# Patient Record
Sex: MALE | Race: WHITE | Employment: FULL TIME | URBAN - NONMETROPOLITAN AREA
[De-identification: names, ages, dates, MRNs, and addresses within clinical notes are randomized per-mention and may not be internally consistent; named-entity substitution may affect disease eponyms.]

---

## 2020-07-18 ENCOUNTER — APPOINTMENT (OUTPATIENT)
Dept: GENERAL RADIOLOGY | Age: 25
End: 2020-07-18

## 2020-07-18 ENCOUNTER — HOSPITAL ENCOUNTER (EMERGENCY)
Age: 25
Discharge: HOME | End: 2020-07-18
Attending: EMERGENCY MEDICINE

## 2020-07-18 VITALS
HEIGHT: 70 IN | HEART RATE: 75 BPM | SYSTOLIC BLOOD PRESSURE: 108 MMHG | WEIGHT: 150 LBS | TEMPERATURE: 98.4 F | OXYGEN SATURATION: 93 % | RESPIRATION RATE: 14 BRPM | BODY MASS INDEX: 21.47 KG/M2 | DIASTOLIC BLOOD PRESSURE: 64 MMHG

## 2020-07-18 LAB
AMPHETAMINE+METHAMPHETAMINE URINE SCREEN: NEGATIVE
BARBITURATE QUANTITATIVE URINE: NEGATIVE
BENZODIAZEPINE QUANTITATIVE URINE: NEGATIVE
CANNABINOID QUANTITATIVE URINE: NEGATIVE
COCAINE METABOLITE QUANTITATIVE URINE: NEGATIVE
ETHYL ALCOHOL, SERUM: 0.29 %
OPIATES, URINE: NEGATIVE
OXYCODONE: NEGATIVE
PHENCYCLIDINE QUANTITATIVE URINE: NEGATIVE

## 2020-07-18 PROCEDURE — 80307 DRUG TEST PRSMV CHEM ANLYZR: CPT

## 2020-07-18 PROCEDURE — 90471 IMMUNIZATION ADMIN: CPT | Performed by: EMERGENCY MEDICINE

## 2020-07-18 PROCEDURE — 6360000002 HC RX W HCPCS: Performed by: EMERGENCY MEDICINE

## 2020-07-18 PROCEDURE — 6370000000 HC RX 637 (ALT 250 FOR IP): Performed by: EMERGENCY MEDICINE

## 2020-07-18 PROCEDURE — G0480 DRUG TEST DEF 1-7 CLASSES: HCPCS

## 2020-07-18 PROCEDURE — 36415 COLL VENOUS BLD VENIPUNCTURE: CPT

## 2020-07-18 PROCEDURE — 99285 EMERGENCY DEPT VISIT HI MDM: CPT

## 2020-07-18 PROCEDURE — 72040 X-RAY EXAM NECK SPINE 2-3 VW: CPT

## 2020-07-18 PROCEDURE — 96372 THER/PROPH/DIAG INJ SC/IM: CPT

## 2020-07-18 PROCEDURE — 90715 TDAP VACCINE 7 YRS/> IM: CPT | Performed by: EMERGENCY MEDICINE

## 2020-07-18 RX ORDER — ACETAMINOPHEN 325 MG/1
650 TABLET ORAL ONCE
Status: COMPLETED | OUTPATIENT
Start: 2020-07-18 | End: 2020-07-18

## 2020-07-18 RX ADMIN — TETANUS TOXOID, REDUCED DIPHTHERIA TOXOID AND ACELLULAR PERTUSSIS VACCINE, ADSORBED 0.5 ML: 5; 2.5; 8; 8; 2.5 SUSPENSION INTRAMUSCULAR at 01:01

## 2020-07-18 RX ADMIN — ACETAMINOPHEN 650 MG: 325 TABLET ORAL at 00:59

## 2020-07-18 ASSESSMENT — ENCOUNTER SYMPTOMS
EYE ITCHING: 0
VOMITING: 0
SHORTNESS OF BREATH: 0
COUGH: 0
PHOTOPHOBIA: 0
EYE REDNESS: 0
ABDOMINAL DISTENTION: 0
DIARRHEA: 0
WHEEZING: 0
NAUSEA: 0
EYE PAIN: 0
CHEST TIGHTNESS: 0
SORE THROAT: 0
EYE DISCHARGE: 0
CONSTIPATION: 0
ABDOMINAL PAIN: 0
BACK PAIN: 0
RHINORRHEA: 0
STRIDOR: 0

## 2020-07-18 ASSESSMENT — PAIN SCALES - GENERAL: PAINLEVEL_OUTOF10: 5

## 2020-07-18 NOTE — ED NOTES
Bed: 006A  Expected date: 7/17/20  Expected time: 11:55 PM  Means of arrival: [facility]  Comments:     Luis Manuel Kirkpatrick RN  07/18/20 0003

## 2020-07-18 NOTE — ED NOTES
Pt back from radiology. Pt VS reassessed. Pt states he still has numbness in his left foot. RR regular. Pt HOB elevated at this time. Pt placed in C collar.  Will continue to monitor      Cecille Powell RN  07/18/20 1999

## 2020-07-18 NOTE — ED NOTES
Pt medicated per MAR. Pt urinated in urinal and then to radiology.       Jersey Mackey RN  07/18/20 6587

## 2020-07-18 NOTE — ED NOTES
Dr. Suellen Rivera at bedside to assessed pt spine and pt taken off back board while this RN maintained c spine      eTre Arroyo RN  07/18/20 0125
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
## 2023-04-03 ENCOUNTER — HOSPITAL ENCOUNTER (EMERGENCY)
Age: 28
Discharge: HOME | End: 2023-04-03
Attending: EMERGENCY MEDICINE
Payer: COMMERCIAL

## 2023-04-03 VITALS
DIASTOLIC BLOOD PRESSURE: 74 MMHG | WEIGHT: 155 LBS | SYSTOLIC BLOOD PRESSURE: 119 MMHG | TEMPERATURE: 97.5 F | HEIGHT: 70 IN | HEART RATE: 88 BPM | OXYGEN SATURATION: 100 % | BODY MASS INDEX: 22.19 KG/M2 | RESPIRATION RATE: 18 BRPM

## 2023-04-03 DIAGNOSIS — M54.9 UPPER BACK PAIN: Primary | ICD-10-CM

## 2023-04-03 PROCEDURE — 99283 EMERGENCY DEPT VISIT LOW MDM: CPT

## 2023-04-03 PROCEDURE — 6370000000 HC RX 637 (ALT 250 FOR IP): Performed by: EMERGENCY MEDICINE

## 2023-04-03 RX ORDER — IBUPROFEN 200 MG
600 TABLET ORAL ONCE
Status: COMPLETED | OUTPATIENT
Start: 2023-04-03 | End: 2023-04-03

## 2023-04-03 RX ORDER — CYCLOBENZAPRINE HCL 5 MG
5 TABLET ORAL 2 TIMES DAILY PRN
Qty: 10 TABLET | Refills: 0 | Status: SHIPPED | OUTPATIENT
Start: 2023-04-03 | End: 2023-04-13

## 2023-04-03 RX ADMIN — IBUPROFEN 600 MG: 200 TABLET, FILM COATED ORAL at 18:18

## 2023-04-03 ASSESSMENT — PAIN DESCRIPTION - LOCATION
LOCATION: BACK

## 2023-04-03 ASSESSMENT — PAIN SCALES - GENERAL
PAINLEVEL_OUTOF10: 5
PAINLEVEL_OUTOF10: 6

## 2023-04-03 ASSESSMENT — PAIN DESCRIPTION - ORIENTATION
ORIENTATION: UPPER
ORIENTATION: UPPER

## 2023-04-03 ASSESSMENT — PAIN - FUNCTIONAL ASSESSMENT
PAIN_FUNCTIONAL_ASSESSMENT: 0-10
PAIN_FUNCTIONAL_ASSESSMENT: 0-10

## 2023-04-03 NOTE — Clinical Note
Suresh Escobar was seen and treated in our emergency department on 4/3/2023. He may return to work on 04/04/2023. Mr. Has needs to be on light duty,  no lifting more than 10 pounds for 5 days     If you have any questions or concerns, please don't hesitate to call.       Airam Vu MD

## 2023-04-03 NOTE — ED NOTES
Presents ambulatory with complaints of upper back pain after putting judith down in his home on Friday. Pain worse with movement. .  Patient did not go to work today due to pain and work suggested that he get checked.      Cherelle Munoz RN  04/03/23 1102

## 2023-04-03 NOTE — ED NOTES
Discharge instructions reviewed with patient and questions answered. Skin warm and dry, color normal for ethnicity. Remains alert and cooperative. Denies further questions or concerns at this time.       Adelfo Whitaker RN  04/03/23 6082

## 2023-04-03 NOTE — ED PROVIDER NOTES
ill-appearing or diaphoretic. HENT:      Head: Atraumatic. Neck:      Thyroid: No thyromegaly. Vascular: No JVD. Trachea: No tracheal deviation. Cardiovascular:      Rate and Rhythm: Normal rate and regular rhythm. Heart sounds: No murmur heard. No friction rub. No gallop. Pulmonary:      Effort: Pulmonary effort is normal.      Breath sounds: Normal breath sounds. Abdominal:      General: Bowel sounds are normal.      Palpations: Abdomen is soft. Tenderness: There is no abdominal tenderness. Musculoskeletal:      Cervical back: Neck supple. No tenderness. Comments: Back examination showed no abnormality. There is slight tenderness palpation of the paraspinal soft tissue in the intrascapular area. There was no spinal tenderness. Neurological:      Mental Status: He is alert. Motor: No weakness. DIFFERENTIAL DIAGNOSIS:       DIAGNOSTIC RESULTS       LABS:   Labs Reviewed - No data to display    EMERGENCY DEPARTMENT COURSE:   Vitals:    Vitals:    04/03/23 1744   BP: 119/74   Pulse: 88   Resp: 18   Temp: 97.5 °F (36.4 °C)   TempSrc: Temporal   SpO2: 100%   Weight: 155 lb (70.3 kg)   Height: 5' 10\" (1.778 m)     MDM:  Patient presented with acute back pain as mentioned above. I reviewed his medical history from the medical records and from the patient himself. He has no chronic comorbidities. I reviewed his vital signs and interpreted them as normal.  He is physical exam is compatible with musculoskeletal upper back pain. Normal neurological exam.  I ordered him 600 mg of Motrin p.o. He is stable for discharge. I discussed diagnosis and treatment plan with him. I gave him strict return instructions. I prescribed him lactulose. FINAL IMPRESSION      1. Upper back pain          DISPOSITION/PLAN   Discharged home in good condition.     PATIENT REFERRED TO:    Please call  to establish a family doctor          DISCHARGE MEDICATIONS:  New

## 2023-04-03 NOTE — DISCHARGE INSTRUCTIONS
Please take Motrin 600 every 8 hours as needed for pain  Take Flexeril twice a day for 5 days  Perform gentle stretching exercises  No heavy lifting for 5 days  Please return if worse or new symptoms

## 2024-03-18 ENCOUNTER — TELEPHONE (OUTPATIENT)
Dept: FAMILY MEDICINE CLINIC | Age: 29
End: 2024-03-18

## 2024-03-18 NOTE — TELEPHONE ENCOUNTER
Patient wanting to schedule new patient appointment with Dr. Leopold. His wife and children follow with you.     Is it okay to schedule him  ?

## 2024-03-18 NOTE — TELEPHONE ENCOUNTER
----- Message from Carmen Vizcarra sent at 3/18/2024  9:51 AM EDT -----  Subject: Appointment Request    Reason for Call: New Patient/New to Provider Appointment needed: New   Patient Request Appointment    QUESTIONS    Reason for appointment request? Requested Provider unavailable - KATELY LEOPOLD     Additional Information for Provider? Spoke with patient's wife Porsha,   she is wondering if Dr. Leopold would be willing to take her  on as   a new patient. She states that her childern (Tamir and Judith Rodarte) are   current patient's of hers. Please return this call to assist, thank you.   ---------------------------------------------------------------------------  --------------  CALL BACK INFO  [phone]; OK to leave message on voicemail  ---------------------------------------------------------------------------  --------------  SCRIPT ANSWERS

## 2024-04-12 ENCOUNTER — OFFICE VISIT (OUTPATIENT)
Dept: FAMILY MEDICINE CLINIC | Age: 29
End: 2024-04-12
Payer: COMMERCIAL

## 2024-04-12 VITALS
BODY MASS INDEX: 22.42 KG/M2 | HEIGHT: 70 IN | OXYGEN SATURATION: 98 % | DIASTOLIC BLOOD PRESSURE: 70 MMHG | SYSTOLIC BLOOD PRESSURE: 106 MMHG | HEART RATE: 82 BPM | WEIGHT: 156.6 LBS

## 2024-04-12 DIAGNOSIS — R07.82 INTERCOSTAL PAIN: ICD-10-CM

## 2024-04-12 DIAGNOSIS — Z00.00 WELL ADULT EXAM: Primary | ICD-10-CM

## 2024-04-12 DIAGNOSIS — F10.90 ALCOHOL USE DISORDER: ICD-10-CM

## 2024-04-12 DIAGNOSIS — R06.83 SNORING: ICD-10-CM

## 2024-04-12 DIAGNOSIS — R53.83 OTHER FATIGUE: ICD-10-CM

## 2024-04-12 PROCEDURE — 99395 PREV VISIT EST AGE 18-39: CPT | Performed by: FAMILY MEDICINE

## 2024-04-12 RX ORDER — ELECTROLYTES/DEXTROSE
1 SOLUTION, ORAL ORAL DAILY
COMMUNITY

## 2024-04-12 SDOH — ECONOMIC STABILITY: HOUSING INSECURITY
IN THE LAST 12 MONTHS, WAS THERE A TIME WHEN YOU DID NOT HAVE A STEADY PLACE TO SLEEP OR SLEPT IN A SHELTER (INCLUDING NOW)?: NO

## 2024-04-12 SDOH — ECONOMIC STABILITY: FOOD INSECURITY: WITHIN THE PAST 12 MONTHS, YOU WORRIED THAT YOUR FOOD WOULD RUN OUT BEFORE YOU GOT MONEY TO BUY MORE.: NEVER TRUE

## 2024-04-12 SDOH — ECONOMIC STABILITY: FOOD INSECURITY: WITHIN THE PAST 12 MONTHS, THE FOOD YOU BOUGHT JUST DIDN'T LAST AND YOU DIDN'T HAVE MONEY TO GET MORE.: NEVER TRUE

## 2024-04-12 SDOH — ECONOMIC STABILITY: INCOME INSECURITY: HOW HARD IS IT FOR YOU TO PAY FOR THE VERY BASICS LIKE FOOD, HOUSING, MEDICAL CARE, AND HEATING?: NOT HARD AT ALL

## 2024-04-12 ASSESSMENT — ENCOUNTER SYMPTOMS
CONSTIPATION: 0
SORE THROAT: 0
TROUBLE SWALLOWING: 0
VOMITING: 0
SHORTNESS OF BREATH: 0
COUGH: 0
DIARRHEA: 0
ABDOMINAL PAIN: 0

## 2024-04-12 ASSESSMENT — PATIENT HEALTH QUESTIONNAIRE - PHQ9
SUM OF ALL RESPONSES TO PHQ9 QUESTIONS 1 & 2: 0
SUM OF ALL RESPONSES TO PHQ QUESTIONS 1-9: 0
2. FEELING DOWN, DEPRESSED OR HOPELESS: NOT AT ALL
1. LITTLE INTEREST OR PLEASURE IN DOING THINGS: NOT AT ALL
SUM OF ALL RESPONSES TO PHQ QUESTIONS 1-9: 0

## 2024-04-15 ENCOUNTER — HOSPITAL ENCOUNTER (OUTPATIENT)
Dept: GENERAL RADIOLOGY | Age: 29
Discharge: HOME | End: 2024-04-15
Payer: COMMERCIAL

## 2024-04-15 ENCOUNTER — HOSPITAL ENCOUNTER (OUTPATIENT)
Age: 29
Discharge: HOME | End: 2024-04-15
Payer: COMMERCIAL

## 2024-04-15 DIAGNOSIS — R53.83 OTHER FATIGUE: ICD-10-CM

## 2024-04-15 DIAGNOSIS — Z00.00 WELL ADULT EXAM: ICD-10-CM

## 2024-04-15 DIAGNOSIS — R07.82 INTERCOSTAL PAIN: ICD-10-CM

## 2024-04-15 DIAGNOSIS — F10.90 ALCOHOL USE DISORDER: ICD-10-CM

## 2024-04-15 LAB
ALBUMIN SERPL BCG-MCNC: 5 G/DL (ref 3.5–5.1)
ALP SERPL-CCNC: 83 U/L (ref 38–126)
ALT SERPL W/O P-5'-P-CCNC: 21 U/L (ref 11–66)
ANION GAP SERPL CALC-SCNC: 13 MEQ/L (ref 8–16)
AST SERPL-CCNC: 30 U/L (ref 5–40)
BASOPHILS ABSOLUTE: 0 THOU/MM3 (ref 0–0.1)
BASOPHILS NFR BLD AUTO: 0.5 %
BILIRUB SERPL-MCNC: 0.8 MG/DL (ref 0.3–1.2)
BUN SERPL-MCNC: 14 MG/DL (ref 7–22)
CALCIUM SERPL-MCNC: 9.5 MG/DL (ref 8.5–10.5)
CHLORIDE SERPL-SCNC: 103 MEQ/L (ref 98–111)
CHOLEST SERPL-MCNC: 192 MG/DL (ref 100–199)
CO2 SERPL-SCNC: 24 MEQ/L (ref 23–33)
CREAT SERPL-MCNC: 0.8 MG/DL (ref 0.4–1.2)
DEPRECATED RDW RBC AUTO: 41.7 FL (ref 35–45)
EOSINOPHIL NFR BLD AUTO: 7.9 %
EOSINOPHILS ABSOLUTE: 0.7 THOU/MM3 (ref 0–0.4)
ERYTHROCYTE [DISTWIDTH] IN BLOOD BY AUTOMATED COUNT: 12.2 % (ref 11.5–14.5)
FOLATE SERPL-MCNC: > 20 NG/ML (ref 4.8–24.2)
GFR SERPL CREATININE-BSD FRML MDRD: > 90 ML/MIN/1.73M2
GLUCOSE SERPL-MCNC: 94 MG/DL (ref 70–108)
HCT VFR BLD AUTO: 48.4 % (ref 42–52)
HDLC SERPL-MCNC: 69 MG/DL
HGB BLD-MCNC: 16.7 GM/DL (ref 14–18)
IMM GRANULOCYTES # BLD AUTO: 0.03 THOU/MM3 (ref 0–0.07)
IMM GRANULOCYTES NFR BLD AUTO: 0.4 %
LDLC SERPL CALC-MCNC: 102 MG/DL
LYMPHOCYTES ABSOLUTE: 2.2 THOU/MM3 (ref 1–4.8)
LYMPHOCYTES NFR BLD AUTO: 26.3 %
MCH RBC QN AUTO: 31.6 PG (ref 26–33)
MCHC RBC AUTO-ENTMCNC: 34.5 GM/DL (ref 32.2–35.5)
MCV RBC AUTO: 91.5 FL (ref 80–94)
MONOCYTES ABSOLUTE: 0.5 THOU/MM3 (ref 0.4–1.3)
MONOCYTES NFR BLD AUTO: 6.1 %
NEUTROPHILS NFR BLD AUTO: 58.8 %
NRBC BLD AUTO-RTO: 0 /100 WBC
PLATELET # BLD AUTO: 218 THOU/MM3 (ref 130–400)
PMV BLD AUTO: 10.4 FL (ref 9.4–12.4)
POTASSIUM SERPL-SCNC: 5 MEQ/L (ref 3.5–5.2)
PROT SERPL-MCNC: 7.6 G/DL (ref 6.1–8)
RBC # BLD AUTO: 5.29 MILL/MM3 (ref 4.7–6.1)
SEGMENTED NEUTROPHILS ABSOLUTE COUNT: 4.9 THOU/MM3 (ref 1.8–7.7)
SODIUM SERPL-SCNC: 140 MEQ/L (ref 135–145)
TRIGL SERPL-MCNC: 107 MG/DL (ref 0–199)
TSH SERPL DL<=0.005 MIU/L-ACNC: 1.85 UIU/ML (ref 0.4–4.2)
VIT B12 SERPL-MCNC: 991 PG/ML (ref 211–911)
WBC # BLD AUTO: 8.4 THOU/MM3 (ref 4.8–10.8)

## 2024-04-15 PROCEDURE — 36415 COLL VENOUS BLD VENIPUNCTURE: CPT

## 2024-04-15 PROCEDURE — 80053 COMPREHEN METABOLIC PANEL: CPT

## 2024-04-15 PROCEDURE — 80061 LIPID PANEL: CPT

## 2024-04-15 PROCEDURE — 85025 COMPLETE CBC W/AUTO DIFF WBC: CPT

## 2024-04-15 PROCEDURE — 71046 X-RAY EXAM CHEST 2 VIEWS: CPT

## 2024-04-15 PROCEDURE — 84443 ASSAY THYROID STIM HORMONE: CPT

## 2024-04-15 PROCEDURE — 82607 VITAMIN B-12: CPT

## 2024-04-15 PROCEDURE — 82746 ASSAY OF FOLIC ACID SERUM: CPT

## 2024-05-03 DIAGNOSIS — Z30.09 VASECTOMY EVALUATION: Primary | ICD-10-CM

## 2024-07-30 ENCOUNTER — OFFICE VISIT (OUTPATIENT)
Dept: FAMILY MEDICINE CLINIC | Age: 29
End: 2024-07-30
Payer: COMMERCIAL

## 2024-07-30 VITALS
HEIGHT: 71 IN | HEART RATE: 76 BPM | WEIGHT: 147.8 LBS | OXYGEN SATURATION: 99 % | DIASTOLIC BLOOD PRESSURE: 72 MMHG | BODY MASS INDEX: 20.69 KG/M2 | SYSTOLIC BLOOD PRESSURE: 108 MMHG

## 2024-07-30 DIAGNOSIS — F10.90 ALCOHOL USE DISORDER: Primary | ICD-10-CM

## 2024-07-30 PROCEDURE — 99213 OFFICE O/P EST LOW 20 MIN: CPT | Performed by: FAMILY MEDICINE

## 2024-07-30 PROCEDURE — G8420 CALC BMI NORM PARAMETERS: HCPCS | Performed by: FAMILY MEDICINE

## 2024-07-30 PROCEDURE — 1036F TOBACCO NON-USER: CPT | Performed by: FAMILY MEDICINE

## 2024-07-30 PROCEDURE — G8427 DOCREV CUR MEDS BY ELIG CLIN: HCPCS | Performed by: FAMILY MEDICINE

## 2024-07-30 ASSESSMENT — ENCOUNTER SYMPTOMS
VOMITING: 0
CONSTIPATION: 0
TROUBLE SWALLOWING: 0
SORE THROAT: 0
COUGH: 0
SHORTNESS OF BREATH: 0
ABDOMINAL PAIN: 0
DIARRHEA: 0

## 2024-07-30 NOTE — PROGRESS NOTES
[facility] [place] [facility]  [address]  [place] [address]  Dept: [phone]     SUBJECTIVE     Robert Rodarte is a 28 y.o.male    Pt presents for follow up of medications.    Pt feeling ok since last visit- interval history and any new issues noted below:     Indicates he has significantly reduced alcohol intake since last visit.  He is no longer drinking on week days.  He does drink 12-18 beers on the weekends.  No legal, family or work related issues reported.  He did not restart counseling at [facility] as previously discussed.    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  Works 2nd shift 5-7 days per week.      Patient Active Problem List   Diagnosis    Other fatigue    Snoring       Current Outpatient Medications   Medication Sig Dispense Refill    Multiple Vitamin (MULTIVITAMIN ADULT) TABS Take 1 tablet by mouth daily       No current facility-administered medications for this visit.       Review of Systems   Constitutional:  Negative for appetite change, fatigue, fever and unexpected weight change.   HENT:  Negative for congestion, ear pain, sore throat and trouble swallowing.    Eyes:  Negative for visual disturbance.   Respiratory:  Negative for cough and shortness of breath.    Cardiovascular:  Negative for chest pain, palpitations and leg swelling.   Gastrointestinal:  Negative for abdominal pain, constipation, diarrhea and vomiting.   Genitourinary:  Negative for dysuria and frequency.   Musculoskeletal:  Negative for arthralgias and myalgias.   Skin:  Negative for rash.   Neurological:  Negative for dizziness.       OBJECTIVE     /72 (Site: Left Upper Arm, Position: Sitting, Cuff Size: Medium Adult)   Pulse 76   Ht 1.803 m (5' 11\")   Wt 67 kg (147 lb 12.8 oz)   SpO2 99%   BMI 20.61 kg/m²   Body mass index is 20.61 kg/m².  BP Readings from Last 3 Encounters:   07/30/24 108/72